# Patient Record
Sex: MALE | Race: WHITE | NOT HISPANIC OR LATINO | Employment: FULL TIME | URBAN - METROPOLITAN AREA
[De-identification: names, ages, dates, MRNs, and addresses within clinical notes are randomized per-mention and may not be internally consistent; named-entity substitution may affect disease eponyms.]

---

## 2022-07-06 DIAGNOSIS — M54.41 LUMBAGO WITH SCIATICA, RIGHT SIDE: ICD-10-CM

## 2022-07-06 DIAGNOSIS — M54.41 ACUTE BACK PAIN WITH SCIATICA, RIGHT: Primary | ICD-10-CM

## 2022-07-06 DIAGNOSIS — G62.9 NEUROPATHY: ICD-10-CM

## 2022-07-27 ENCOUNTER — HOSPITAL ENCOUNTER (OUTPATIENT)
Dept: RADIOLOGY | Facility: HOSPITAL | Age: 32
Discharge: HOME | End: 2022-07-27
Attending: NURSE PRACTITIONER
Payer: MEDICAID

## 2022-07-27 DIAGNOSIS — M54.41 LUMBAGO WITH SCIATICA, RIGHT SIDE: ICD-10-CM

## 2022-07-27 DIAGNOSIS — G62.9 NEUROPATHY: ICD-10-CM

## 2022-07-27 PROCEDURE — 72148 MRI LUMBAR SPINE W/O DYE: CPT | Mod: TC,PO

## 2022-08-22 DIAGNOSIS — K62.5 RECTAL BLEEDING: ICD-10-CM

## 2022-08-22 DIAGNOSIS — R10.13 EPIGASTRIC PAIN: Primary | ICD-10-CM

## 2022-08-22 DIAGNOSIS — R19.5 LOOSE STOOLS: ICD-10-CM

## 2022-08-22 DIAGNOSIS — R11.0 NAUSEA: ICD-10-CM

## 2022-09-07 ENCOUNTER — OFFICE VISIT (OUTPATIENT)
Dept: NEUROSURGERY | Facility: CLINIC | Age: 32
End: 2022-09-07
Payer: MEDICAID

## 2022-09-07 VITALS
RESPIRATION RATE: 18 BRPM | SYSTOLIC BLOOD PRESSURE: 127 MMHG | HEIGHT: 72 IN | HEART RATE: 80 BPM | WEIGHT: 229.94 LBS | DIASTOLIC BLOOD PRESSURE: 84 MMHG | BODY MASS INDEX: 31.14 KG/M2

## 2022-09-07 DIAGNOSIS — M51.36 DDD (DEGENERATIVE DISC DISEASE), LUMBAR: ICD-10-CM

## 2022-09-07 DIAGNOSIS — M54.9 DORSALGIA, UNSPECIFIED: Primary | ICD-10-CM

## 2022-09-07 DIAGNOSIS — M43.16 SPONDYLOLISTHESIS OF LUMBAR REGION: ICD-10-CM

## 2022-09-07 PROCEDURE — 3079F PR MOST RECENT DIASTOLIC BLOOD PRESSURE 80-89 MM HG: ICD-10-PCS | Mod: CPTII,,, | Performed by: STUDENT IN AN ORGANIZED HEALTH CARE EDUCATION/TRAINING PROGRAM

## 2022-09-07 PROCEDURE — 1159F PR MEDICATION LIST DOCUMENTED IN MEDICAL RECORD: ICD-10-PCS | Mod: CPTII,,, | Performed by: STUDENT IN AN ORGANIZED HEALTH CARE EDUCATION/TRAINING PROGRAM

## 2022-09-07 PROCEDURE — 1159F MED LIST DOCD IN RCRD: CPT | Mod: CPTII,,, | Performed by: STUDENT IN AN ORGANIZED HEALTH CARE EDUCATION/TRAINING PROGRAM

## 2022-09-07 PROCEDURE — 3008F BODY MASS INDEX DOCD: CPT | Mod: CPTII,,, | Performed by: STUDENT IN AN ORGANIZED HEALTH CARE EDUCATION/TRAINING PROGRAM

## 2022-09-07 PROCEDURE — 99204 PR OFFICE/OUTPT VISIT, NEW, LEVL IV, 45-59 MIN: ICD-10-PCS | Mod: S$PBB,,, | Performed by: STUDENT IN AN ORGANIZED HEALTH CARE EDUCATION/TRAINING PROGRAM

## 2022-09-07 PROCEDURE — 99204 OFFICE O/P NEW MOD 45 MIN: CPT | Mod: S$PBB,,, | Performed by: STUDENT IN AN ORGANIZED HEALTH CARE EDUCATION/TRAINING PROGRAM

## 2022-09-07 PROCEDURE — 3079F DIAST BP 80-89 MM HG: CPT | Mod: CPTII,,, | Performed by: STUDENT IN AN ORGANIZED HEALTH CARE EDUCATION/TRAINING PROGRAM

## 2022-09-07 PROCEDURE — 3074F SYST BP LT 130 MM HG: CPT | Mod: CPTII,,, | Performed by: STUDENT IN AN ORGANIZED HEALTH CARE EDUCATION/TRAINING PROGRAM

## 2022-09-07 PROCEDURE — 3008F PR BODY MASS INDEX (BMI) DOCUMENTED: ICD-10-PCS | Mod: CPTII,,, | Performed by: STUDENT IN AN ORGANIZED HEALTH CARE EDUCATION/TRAINING PROGRAM

## 2022-09-07 PROCEDURE — 3074F PR MOST RECENT SYSTOLIC BLOOD PRESSURE < 130 MM HG: ICD-10-PCS | Mod: CPTII,,, | Performed by: STUDENT IN AN ORGANIZED HEALTH CARE EDUCATION/TRAINING PROGRAM

## 2022-09-07 RX ORDER — ONDANSETRON 4 MG/1
TABLET, ORALLY DISINTEGRATING ORAL
COMMUNITY
Start: 2022-08-18

## 2022-09-07 RX ORDER — HYDROCODONE BITARTRATE AND ACETAMINOPHEN 5; 325 MG/1; MG/1
1 TABLET ORAL
COMMUNITY
Start: 2022-07-20 | End: 2022-09-20 | Stop reason: SDUPTHER

## 2022-09-07 RX ORDER — IBUPROFEN 800 MG/1
TABLET ORAL
COMMUNITY
Start: 2022-07-16

## 2022-09-07 NOTE — PROGRESS NOTES
[facility]  Clinic Consult     Consult Requested By: Geovanny Capps Jr, MD, [facility]        SUBJECTIVE:     Chief Complaint:   Chief Complaint   Patient presents with    Lumbar Spine Pain (L-Spine)     Patient presents to clinic with c/o R sided LBP x years with numbness/tingling that radiates into the leg and foot.        History of Present Illness:  Fernando Skinner is a 31 y.o. male who presents with a long history of back pain.  He was very active playing football, riding bikes, he has had several hard falls skateboarding.  He noted in 2017 working at [employer] lifting equipment up to 300lbs with co-workers and having severe back pain and dropping it  Now his pain has worsened and is associated with leg pain R> Left with tingling and numnbess  In thigh but shoots to foot    Has had recommendations from several doctors  Tired bracing for a few months but no improvement  Chiropractor couldn't help him    He is now in tears and has trouble with adls    Does have a mechanical component; he can improve lying down and much worse with standing and any blts             Review of patient's allergies indicates:   Allergen Reactions    Sulfa (sulfonamide antibiotics)        History reviewed. No pertinent past medical history.  History reviewed. No pertinent surgical history.  History reviewed. No pertinent family history.  Social History     Tobacco Use    Smoking status: Some Days     Packs/day: 0.50     Types: Cigarettes   Substance Use Topics    Alcohol use: Yes    Drug use: No        Review of Systems:      Constitutional: no fever, chills or night sweats. No changes in weight   Eyes: no diplopia, lid drooping, loss of vision   ENT: no nasal congestion, sore throat, discharge  Respiratory: no cough, shortness of breath, or pain  Cardiovascular: no chest pain or palpitations   Gastrointestinal: no nausea or vomiting + abd pain getting worked up   Genitourinary:  no hematuria or dysuria   Integument/Breast: no rash or pruritis   Hematologic/Lymphatic: no easy bruising or lymphadenopathy   Musculoskeletal:+   Neurological: no seizures or tremors   Behavioral/Psych: no auditory or visual hallucinations   Endocrine: no heat or cold intolerance   All other systems reviewed and are negative.      OBJECTIVE:     Vital Signs (Most Recent):  Pulse: 80 (09/07/22 1257)  Resp: 18 (09/07/22 1257)  BP: 127/84 (09/07/22 1257)    Physical Exam:    General: well developed, well nourished, no distress. .  Mental Status: Awake, Alert, Oriented x 4  Language: No aphasia  Speech: No dysarthria  Head: normocephalic, atraumatic.  Neck: trachea midline, no JVD   Cardiovascular: no LE edema  Pulmonary: normal respirations, no signs of respiratory distress  Abdomen: soft, non-distended  Skin: Skin is warm, dry and intact.    Motor Strength:  No abnormal movements seen.     Strength  Deltoids Triceps Biceps Wrist Extension Wrist Flexion Hand  Interossei     Upper: R 5/5 5/5 5/5 5/5 5/5 5/5 5/5      L 5/5 5/5 5/5 5/5 5/5 5/5 5/5       Iliopsoas Quadriceps Knee  Flexion Tibialis  anterior Gastro- cnemius EHL  Foot Eversion Foot inversion   Lower: R 5/5 5/5 5/5 5/5 5/5 5/5 5/5 5/5 5/5    L 5/5 5/5 5/5 5/5 5/5 5/5 5/5 5/5 5/5     SILT,PP dec lt pp right thigh and shin      DTR's: 1 + and symmetric in UE and LE  Avelar: absent  Clonus: absent  Babinski: absent    Gait:antalgic  Diffiult sit to stand  Diffuse pain lumbar paraspinal              Diagnostic Results:  I have independently reviewed the following imaging:  MRI: Reviewed  7/22    L2-5 DDD, disc osteophytes  L3-4 suspected pars fx  With grade 1 spondylolistheis    ASSESSMENT/PLAN:     L2-5 DDD  L3-4 spondyl  Progressive over years  High tate and severity, tearful with mechanical pain; limited all ADLs  Still working       chronic, worsening mechanical back pain with radiculopathy  L2-5 spondylosis  L3-4 grade 1 spondy, suspected pars  fx  dynamic xray CT, PT eval / pain mg   Will review images when complete          Ok Rendon MD  Neurosurgery

## 2022-09-08 ENCOUNTER — HOSPITAL ENCOUNTER (OUTPATIENT)
Dept: RADIOLOGY | Facility: HOSPITAL | Age: 32
Discharge: HOME | End: 2022-09-08
Attending: NURSE PRACTITIONER
Payer: MEDICAID

## 2022-09-08 DIAGNOSIS — R19.5 LOOSE STOOLS: ICD-10-CM

## 2022-09-08 DIAGNOSIS — R10.13 EPIGASTRIC PAIN: ICD-10-CM

## 2022-09-08 DIAGNOSIS — R11.0 NAUSEA: ICD-10-CM

## 2022-09-08 DIAGNOSIS — K62.5 RECTAL BLEEDING: ICD-10-CM

## 2022-09-08 PROCEDURE — 76700 US EXAM ABDOM COMPLETE: CPT | Mod: TC,PO

## 2022-09-13 DIAGNOSIS — M51.36 DDD (DEGENERATIVE DISC DISEASE), LUMBAR: ICD-10-CM

## 2022-09-13 DIAGNOSIS — M54.9 DORSALGIA, UNSPECIFIED: Primary | ICD-10-CM

## 2022-09-14 ENCOUNTER — HOSPITAL ENCOUNTER (OUTPATIENT)
Dept: RADIOLOGY | Facility: HOSPITAL | Age: 32
Discharge: HOME | End: 2022-09-14
Attending: STUDENT IN AN ORGANIZED HEALTH CARE EDUCATION/TRAINING PROGRAM
Payer: MEDICAID

## 2022-09-14 DIAGNOSIS — M54.9 DORSALGIA, UNSPECIFIED: ICD-10-CM

## 2022-09-14 PROCEDURE — 72131 CT LUMBAR SPINE WITHOUT CONTRAST: ICD-10-PCS | Mod: 26,,, | Performed by: RADIOLOGY

## 2022-09-14 PROCEDURE — 72110 X-RAY EXAM L-2 SPINE 4/>VWS: CPT | Mod: TC,FY

## 2022-09-14 PROCEDURE — 72131 CT LUMBAR SPINE W/O DYE: CPT | Mod: TC

## 2022-09-14 PROCEDURE — 72131 CT LUMBAR SPINE W/O DYE: CPT | Mod: 26,,, | Performed by: RADIOLOGY

## 2022-09-14 PROCEDURE — 72110 X-RAY EXAM L-2 SPINE 4/>VWS: CPT | Mod: 26,,, | Performed by: RADIOLOGY

## 2022-09-14 PROCEDURE — 72110 XR LUMBAR SPINE AP AND LAT WITH FLEX/EXT: ICD-10-PCS | Mod: 26,,, | Performed by: RADIOLOGY

## 2022-09-20 ENCOUNTER — OFFICE VISIT (OUTPATIENT)
Dept: PAIN MEDICINE | Facility: CLINIC | Age: 32
End: 2022-09-20
Payer: MEDICAID

## 2022-09-20 VITALS
HEIGHT: 72 IN | HEART RATE: 75 BPM | OXYGEN SATURATION: 99 % | WEIGHT: 187.06 LBS | DIASTOLIC BLOOD PRESSURE: 80 MMHG | BODY MASS INDEX: 25.34 KG/M2 | SYSTOLIC BLOOD PRESSURE: 140 MMHG

## 2022-09-20 DIAGNOSIS — M43.16 SPONDYLOLISTHESIS OF LUMBAR REGION: ICD-10-CM

## 2022-09-20 DIAGNOSIS — M51.36 DDD (DEGENERATIVE DISC DISEASE), LUMBAR: ICD-10-CM

## 2022-09-20 DIAGNOSIS — M54.16 RIGHT LUMBAR RADICULOPATHY: Primary | ICD-10-CM

## 2022-09-20 DIAGNOSIS — M54.9 DORSALGIA, UNSPECIFIED: ICD-10-CM

## 2022-09-20 PROCEDURE — 99999 PR PBB SHADOW E&M-EST. PATIENT-LVL IV: CPT | Mod: PBBFAC,,, | Performed by: ANESTHESIOLOGY

## 2022-09-20 PROCEDURE — 99204 OFFICE O/P NEW MOD 45 MIN: CPT | Mod: S$PBB,,, | Performed by: ANESTHESIOLOGY

## 2022-09-20 PROCEDURE — 3008F BODY MASS INDEX DOCD: CPT | Mod: CPTII,,, | Performed by: ANESTHESIOLOGY

## 2022-09-20 PROCEDURE — 3008F PR BODY MASS INDEX (BMI) DOCUMENTED: ICD-10-PCS | Mod: CPTII,,, | Performed by: ANESTHESIOLOGY

## 2022-09-20 PROCEDURE — 3077F SYST BP >= 140 MM HG: CPT | Mod: CPTII,,, | Performed by: ANESTHESIOLOGY

## 2022-09-20 PROCEDURE — 3079F DIAST BP 80-89 MM HG: CPT | Mod: CPTII,,, | Performed by: ANESTHESIOLOGY

## 2022-09-20 PROCEDURE — 99204 PR OFFICE/OUTPT VISIT, NEW, LEVL IV, 45-59 MIN: ICD-10-PCS | Mod: S$PBB,,, | Performed by: ANESTHESIOLOGY

## 2022-09-20 PROCEDURE — 99214 OFFICE O/P EST MOD 30 MIN: CPT | Mod: PBBFAC,PN | Performed by: ANESTHESIOLOGY

## 2022-09-20 PROCEDURE — 3079F PR MOST RECENT DIASTOLIC BLOOD PRESSURE 80-89 MM HG: ICD-10-PCS | Mod: CPTII,,, | Performed by: ANESTHESIOLOGY

## 2022-09-20 PROCEDURE — 99999 PR PBB SHADOW E&M-EST. PATIENT-LVL IV: ICD-10-PCS | Mod: PBBFAC,,, | Performed by: ANESTHESIOLOGY

## 2022-09-20 PROCEDURE — 3077F PR MOST RECENT SYSTOLIC BLOOD PRESSURE >= 140 MM HG: ICD-10-PCS | Mod: CPTII,,, | Performed by: ANESTHESIOLOGY

## 2022-09-20 RX ORDER — PANTOPRAZOLE SODIUM 20 MG/1
TABLET, DELAYED RELEASE ORAL
COMMUNITY
Start: 2022-07-07

## 2022-09-20 RX ORDER — ALPRAZOLAM 0.5 MG/1
1 TABLET, ORALLY DISINTEGRATING ORAL ONCE AS NEEDED
Status: CANCELLED | OUTPATIENT
Start: 2022-09-20 | End: 2034-02-16

## 2022-09-20 RX ORDER — HYDROCODONE BITARTRATE AND ACETAMINOPHEN 5; 325 MG/1; MG/1
1 TABLET ORAL EVERY 8 HOURS PRN
Qty: 15 TABLET | Refills: 0 | Status: SHIPPED | OUTPATIENT
Start: 2022-09-20

## 2022-09-20 RX ORDER — GABAPENTIN 300 MG/1
300 CAPSULE ORAL NIGHTLY
Qty: 30 CAPSULE | Refills: 1 | Status: ON HOLD | OUTPATIENT
Start: 2022-09-20 | End: 2022-11-18 | Stop reason: HOSPADM

## 2022-09-20 RX ORDER — INDOMETHACIN 25 MG/1
CAPSULE ORAL
COMMUNITY
Start: 2022-08-03

## 2022-09-20 NOTE — PROGRESS NOTES
This note was completed with dictation software and grammatical errors may exist.    Chief Complaint   Patient presents with    Back Pain        HPI: Fernando Skinner is a 31 y.o. year old male patient who has no significant past medical history. He presents in referral from Dr. Ok Rendon for back pain, right leg pain.  Injection.  He reports that he has had back pain for the last 15 years, has had multiple injuries, skateboarding, football.  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  He states that over the last year his pain has severely worsened and has gotten to the point where he has difficulty going to work but he has been making sure that he stable pay the bills.  He states the pain is across the bilateral low back but throughout the entire right leg.  He does have some symptoms in the left side at times but primarily it is in the right side.  He denies any adamairs weakness, no falls.  He states that he gets pain into the right testicle, right anterior thigh, right calf and anterior shin.  He describes the pain as aching, dull, throbbing, tight, deep, numb sharp and shooting.  Is worse with prolonged sitting or standing, doing any bending, coughing or sneezing or getting out of a bed or a chair.  He does have some left foot weakness from an injury many years ago but he does not feel that there is anything related to his back necessarily.  He does have difficulty picking up his right thigh.  He has been seen by Neurosurgery, Dr. Rendon who has done CT, MRI and x-rays.  He was sent here for possible epidural steroid      Pain intervention history:None    Spine surgeries:  None    Antineuropathics:  NSAIDs:  Ibuprofen 800  Physical therapy:He is currently set up to start PT  Antidepressants:  Muscle relaxers:  Opioids:Had been taking some hydrocodone that was left over from a previous surgery  Antiplatelets/Anticoagulants:        ROS:  Reports weight loss,  stomach pain, nausea, joint stiffness, back pain, difficulty sleeping.  Balance of review of systems is negative.    No results found for: LABA1C, HGBA1C    No results found for: WBC, HGB, HCT, MCV, PLT          History reviewed. No pertinent past medical history.    History reviewed. No pertinent surgical history.    Social History     Socioeconomic History    Marital status: Single   Tobacco Use    Smoking status: Some Days     Packs/day: 0.50     Types: Cigarettes   Substance and Sexual Activity    Alcohol use: Yes    Drug use: No    Sexual activity: Yes     Partners: Female         Medications/Allergies: See med card    Vitals:    22 0921   BP: (!) 140/80   Pulse: 75   SpO2: 99%   Weight: 84.8 kg (187 lb 1 oz)   Height: 6' (1.829 m)   PainSc:   8   PainLoc: Back     Body mass index is 25.37 kg/m².    Physical exam:  Gen: A and O x3, tearful  Skin: No rashes or obvious lesions  HEENT: PERRLA, no obvious deformities on ears or in canals. Trachea midline.  CVS: Regular rate and rhythm, normal palpable pulses.  Resp:No increased work of breathing, symmetrical chest rise.  Abdomen: Soft, NT/ND.  Musculoskeletal: No antalgic gait. Sits in a forward flexed position.          Neuro:    Iliopsoas Quadriceps Knee  Flexion Tibialis  anterior Gastro- cnemius EHL   Lower: R 4/5 5/5 5/5 5/5 5/5 5/5    L 5/5 5/5 5/5 5/5 5/5 4/5      Left  Right    Patellar DTR 2+ 2+   Achilles DTR 2+ 2+                    Intact and symmetrical to light touch and pinprick in L1-S1 dermatomes bilaterally.    Lumbar spine:  Lumbar spine:  Full with flexion, severely reduced with extension with increased pain in the back and right leg  Demetrio's test causes no increased pain on either side.    Supine straight leg raise is positive for right leg pain at 45°   Internal and external rotation of the hip causes no increased pain on either side.  Myofascial exam: No tenderness to palpation across lumbar paraspinous muscles.    Imagin22  MRI L-spine:   There is no evidence of lumbar fracture or osseous destructive lesion. Degenerative endplate signal changes are noted at L3-4. There is bilateral L3 spondylolysis with grade 1 spondylolisthesis of up to 4 mm. Alignment at all of the levels is normal. Signal within the conus medullaris is within normal limits.   T12-L1: Mild broad-based disc bulge without significant central canal or foraminal stenosis.   L1-2: No significant abnormalities.   L2-3: There is disc desiccation with mild broad-based disc protrusion and outer annular tear. There is resultant mild narrowing of the spinal canal. The foramina are unremarkable.   L3-4: Grade 1 spondylolisthesis and mild disc bulging result in mild narrowing of the spinal canal. There is mild to moderate bilateral foraminal narrowing. Disc material is in close proximity to the L3 nerve roots, without definite nerve root impingement.   L4-5: Mild broad-based disc bulge results in mild narrowing of the central canal and foramina, without nerve root impingement.   L5-S1: Mild right-sided degenerative facet hypertrophy. No significant central canal or foraminal stenosis.    9/14/22 CT L-spine:  Vertebral column: As seen on comparison studies, there is grade 1 spondylolisthesis at the L3-4 level measuring approximately 4-5 mm on supine CT with a similar appearance to comparison MRI.  There is bilateral spondylolysis at this level as well as at the L2-3 level.  There is moderate to marked disc space narrowing at the L3-4 level.  There is also mild retrolisthesis of L4 on L5 measuring approximately 3 mm, unchanged.  Alignment is otherwise normal.  Vertebral body height is preserved.  There is no fracture.   Spinal canal, conus, epidural space: The spinal canal may be borderline small on a developmental basis.  There is no abnormal epidural mass or fluid collection.   Findings by level:   T12-L1: There is a mild disc bulge.  There is no spinal canal or significant  foraminal stenosis.  There is no change.   L1-2: There is no spinal canal or significant foraminal stenosis.   L2-3: There is a mild disc bulge which flattens the ventral dural sac.  There is no spinal stenosis.  There is mild bilateral foraminal stenosis.  There is bilateral spondylolysis.   L3-4: There is grade 1 spondylolisthesis with bilateral spondylolysis, disc space narrowing with unroofing of a moderate diffuse disc bulge.  There is no spinal stenosis but there is moderate bilateral foraminal stenosis.   L4-5: There is mild retrolisthesis of L4 on L5.  There is mild disc space narrowing at this level.  There is inferior unroofing of a broad disc protrusion.  There is no spinal stenosis.  There is moderate left and mild right foraminal stenosis without change.   L5-S1: There is no spinal canal or foraminal stenosis.  There is mild facet joint arthropathy and partial sacralization of L5 on the left.   Soft tissues, other: The prevertebral soft tissues are normal.  The aorta is normal in caliber.  There is no hydronephrosis.    9/14/22 L-spine flex/ext:  1. There is anterolisthesis of L3 on L4 where there is bilateral spondylolysis.  There is motion at this level with flexion and extension.  2. There is mild retrolisthesis of L4 on L5 which is unchanged on flexion or extension.  3. There is no acute fracture.    Assessment:  Fernando Skinner is a 31 y.o. year old male patient who has no past medical history on file. He presents in referral from Dr. Ok Rendon for back pain.  1. Right lumbar radiculopathy  Vital signs    Verify informed consent    Notify physician     Notify physician     Notify physician (specify)    Diet NPO    Case Request Operating Room: Injection-steroid-epidural-lumbar L4/5 to right    Place in Outpatient    alprazolam ODT dissolvable tablet 1 mg      2. Spondylolisthesis of lumbar region        3. Dorsalgia, unspecified  Ambulatory referral/consult to Pain Clinic      4. DDD  (degenerative disc disease), lumbar            Plan:  1.  We discussed that he has spondylolisthesis at L3/4 with foraminal narrowing particularly to the right side at L3/4 which would seem to correspond with his right thigh pain, also some foraminal narrowing on the right side at L4/5.  He states that he would have surgery at this point if this would fix it but we discussed possibly trying epidural steroid injection to see if this provides some relief so that he does not have to undergo surgery just yet.  At this point he does have some right iliopsoas weakness but unclear if this is due to pain.  We discussed that if he is not responding to physical therapy, injections and medication, he may need to have surgery.    2.  I am going to set him up for an L4/5 HERLINDA with spread to the right side to cover both the L3/4 and L4/5 levels.  3.  I have sent a gabapentin prescription to take 300 mg nightly for now.  I have also called in a small course of hydrocodone to use sparingly.  We discussed that this would not be a long-term prescription.  4.  He is starting physical therapy, is difficult for his work schedule but I encouraged him to do this.  5. He will follow up in 2 weeks after his injection or sooner as needed.        Thank you for referring this interesting patient, and I look forward to continuing to collaborate in his care.

## 2022-09-25 ENCOUNTER — HOSPITAL ENCOUNTER (EMERGENCY)
Facility: HOSPITAL | Age: 32
Discharge: HOME | End: 2022-09-25
Attending: EMERGENCY MEDICINE
Payer: MEDICAID

## 2022-09-25 VITALS
SYSTOLIC BLOOD PRESSURE: 123 MMHG | BODY MASS INDEX: 25.73 KG/M2 | RESPIRATION RATE: 16 BRPM | HEIGHT: 72 IN | HEART RATE: 101 BPM | DIASTOLIC BLOOD PRESSURE: 75 MMHG | OXYGEN SATURATION: 100 % | WEIGHT: 190 LBS | TEMPERATURE: 98 F

## 2022-09-25 DIAGNOSIS — Z63.4 GRIEF AT LOSS OF CHILD: Primary | ICD-10-CM

## 2022-09-25 DIAGNOSIS — F43.21 GRIEF AT LOSS OF CHILD: Primary | ICD-10-CM

## 2022-09-25 DIAGNOSIS — F43.0 ACUTE STRESS DISORDER: ICD-10-CM

## 2022-09-25 PROCEDURE — 99205 PR OFFICE/OUTPT VISIT, NEW, LEVL V, 60-74 MIN: ICD-10-PCS | Mod: AF,HB,95, | Performed by: STUDENT IN AN ORGANIZED HEALTH CARE EDUCATION/TRAINING PROGRAM

## 2022-09-25 PROCEDURE — 99205 OFFICE O/P NEW HI 60 MIN: CPT | Mod: AF,HB,95, | Performed by: STUDENT IN AN ORGANIZED HEALTH CARE EDUCATION/TRAINING PROGRAM

## 2022-09-25 PROCEDURE — 25000003 PHARM REV CODE 250: Performed by: EMERGENCY MEDICINE

## 2022-09-25 PROCEDURE — 99283 EMERGENCY DEPT VISIT LOW MDM: CPT

## 2022-09-25 RX ORDER — LORAZEPAM 1 MG/1
1 TABLET ORAL
Status: COMPLETED | OUTPATIENT
Start: 2022-09-25 | End: 2022-09-25

## 2022-09-25 RX ORDER — LORAZEPAM 1 MG/1
0.5 TABLET ORAL EVERY 6 HOURS PRN
Qty: 10 TABLET | Refills: 0 | Status: SHIPPED | OUTPATIENT
Start: 2022-09-25 | End: 2022-11-16

## 2022-09-25 RX ADMIN — LORAZEPAM 1 MG: 1 TABLET ORAL at 07:09

## 2022-09-25 SDOH — SOCIAL DETERMINANTS OF HEALTH (SDOH): DISSAPEARANCE AND DEATH OF FAMILY MEMBER: Z63.4

## 2022-09-26 NOTE — ED PROVIDER NOTES
Encounter Date: 9/25/2022       History     Chief Complaint   Patient presents with    Mental Health Problem     Mental health evaluation after loss of child     I received turnover from  her requesting patient have psychiatry consultation secondary to acute brief episode.  Today unfortunately patient's son passed away.  Per  upon hearing news patient was extremely distraught, angry, overwhelmed with grief.  Patient came to the emergency depart requesting evaluation.    Review of patient's allergies indicates:   Allergen Reactions    Sulfa (sulfonamide antibiotics)      No past medical history on file.  No past surgical history on file.  No family history on file.  Social History     Tobacco Use    Smoking status: Some Days     Packs/day: 0.50     Types: Cigarettes   Substance Use Topics    Alcohol use: Yes    Drug use: No     Review of Systems   Unable to perform ROS: Acuity of condition     Physical Exam     Initial Vitals [09/25/22 1934]   BP Pulse Resp Temp SpO2   123/75 101 16 98.1 °F (36.7 °C) 100 %      MAP       --         Physical Exam    Nursing note and vitals reviewed.  Constitutional: He is not diaphoretic. He appears distressed.   Pleasant, polite.   HENT:   Head: Normocephalic and atraumatic.   Eyes: EOM are normal.   Neck: Neck supple.   Normal range of motion.  Pulmonary/Chest: No respiratory distress.   Musculoskeletal:      Cervical back: Normal range of motion and neck supple.     Neurological: He is alert.   Skin: Skin is warm and dry.   Psychiatric: His mood appears anxious. His affect is angry and labile. He exhibits a depressed mood.       ED Course   Procedures  Labs Reviewed - No data to display       Imaging Results    None          Medications   LORazepam tablet 1 mg (1 mg Oral Given 9/25/22 1954)     Medical Decision Making:   Initial Assessment:   Patient experiencing acute grief episode  Differential Diagnosis:   Acute grief  ED Management:  Telepsychiatry  recommended Ativan therapy.  Patient given prescription for Ativan as well as oral Ativan in the emergency department.  In patient consultation to  was placed for follow-up for patient to receive counseling.  The  is in the room with patient.                        Clinical Impression:   Final diagnoses:  [F43.21, Z63.4] Grief at loss of child (Primary)        ED Disposition Condition    Discharge Stable          ED Prescriptions       Medication Sig Dispense Start Date End Date Auth. Provider    LORazepam (ATIVAN) 1 MG tablet Take 0.5 tablets (0.5 mg total) by mouth every 6 (six) hours as needed for Anxiety. 10 tablet 9/25/2022 10/25/2022 Wilner Miles MD          Follow-up Information       Follow up With Specialties Details Why Contact Info    Geovanny Capps Jr., MD Family Medicine In 1 week  [address] [facility] [address]  [phone]               Wilner Miles MD  09/25/22 2012

## 2022-09-26 NOTE — CONSULTS
[facility]  Psychiatry  Telepsychiatry Consult Note    Patient agreeable to consultation via telepsychiatry.    Tele-Consultation from Psychiatry started: 9/25/2022 at 7:30p  The chief complaint leading to psychiatric consultation is: acute grief response  This consultation was requested by Dr. Miles the Emergency Department attending physician.  The location of the consulting psychiatrist is [place], [place]  The patient location is  [facility] EMERGENCY DEPARTMENT   The patient arrived at the ED at: approx 6:45p  Also present with the patient at the time of the consultation: nurse    Patient Identification:   Fernando Skinner is a 31 y.o. male.    Patient information was obtained from the patient  Patient presented voluntarily to the Emergency Department by private vehicle.    Consults  Consult Start Time: 09/25/2022 19:30 CDT        Subjective:     History of Present Illness:  31 year old with unknown past psych history presenting to the hospital after notification that his teenage son could not be resuscitated and passed away in [facility] ER this evening. Patient was at home when he was notified and proceeded to throw items around the house, per ED doctor Jd he was offered to be seen at the ED to help with acute grief.     On interview with patient, he was kneeling on the floor and crying unconsolably. Offered patient listening ear and calming medications. Pt not interested to talk but did say he may appreciate meds.     Pt refusing at this time to discuss full history--     Psych Review of Symptoms: items highlighted should be considered positive  KEL, pt refused    Psychiatric History:   KEL, pt refusing    Substance Abuse History: KEL    Legal History: Past charges/incarcerations: KEL    Family Psychiatric History:   KEL    Social History:  KEL    Psychiatric Mental Status Exam:  Arousal: alert  Sensorium/Orientation: oriented to grossly intact  Behavior/Cooperation: psychomotor agitation  "  Speech:  emotional  Language: grossly intact  Mood: " Oh my god "   Affect:  dysphoric  Thought Process: KEL  Thought Content: KEL  Auditory hallucinations: KEL  Visual hallucinations: KEL  Paranoia: KEL  Delusions:  KEL  Suicidal ideation: KEL   Homicidal ideation: KEL  Attention/Concentration: KEL  Memory:  KEL  Fund of Knowledge:  [facility]  Abstract reasoning: [facility]  Insight: KEL  Judgment: KEL      Past Medical History: No past medical history on file.   Laboratory Data:   Labs Reviewed   CBC W/ AUTO DIFFERENTIAL   COMPREHENSIVE METABOLIC PANEL   URINALYSIS, REFLEX TO URINE CULTURE   DRUG SCREEN PANEL, URINE EMERGENCY   ALCOHOL,MEDICAL (ETHANOL)   ACETAMINOPHEN LEVEL   SALICYLATE LEVEL       Neurological History:  KEL    Allergies:   Review of patient's allergies indicates:   Allergen Reactions    Sulfa (sulfonamide antibiotics)        Medications in ER: Medications - No data to display    Medications at home: unk    No new subjective & objective note has been filed under this hospital service since the last note was generated.      Assessment - Diagnosis - Goals:     Assessment:  Acute Stress D/o  Grief    Pt's son  several hours prior to consultation- pt observed crying inconsolably.     Plan:  1. Dispo/Legal Status: unable to assess safety at this time, pt willing to remain in ED for medication to treat acute dysphoria- should pt endorse plan to harm himself immenently when he is calmer and able to speak, please PEC and re-consult for full assessment  2. Scheduled Medications: noneDefer changes to primary inpt team. Defer any non-psych meds to the ER MD.  3. PRN Medications: Ativan prn non-redirectable agitation associated with grief and if needed to help the pt more effectively interact with his environment.   4. Precautions/Nursing: violence (per collateral, pt was agitated at home and throwing things)  5. To-Do: reassess if needed, provide patient with supportive therapeutic check-ins  6. Provide patient " with grief resources per [facility]       Time with patient: 6 minutes      More than 50% of the time was spent counseling/coordinating care    Consulting clinician was informed of the encounter and consult note.    Consultation ended: 9/25/2022 at 7:58p    Georgette Vanegas MD   Psychiatry  [facility]

## 2022-09-30 ENCOUNTER — TELEPHONE (OUTPATIENT)
Dept: PAIN MEDICINE | Facility: CLINIC | Age: 32
End: 2022-09-30
Payer: MEDICAID

## 2022-09-30 NOTE — TELEPHONE ENCOUNTER
----- Message from Lashawn Can sent at 9/30/2022  9:05 AM CDT -----  Contact: Pt  Type: Needs Medical Advice    Who Called:  Pt    Best Call Back Number: [phone]    Additional Information: Please call back about upcoming procedure.  Pt's son passed away.      Thanks.

## 2022-10-04 ENCOUNTER — CLINICAL SUPPORT (OUTPATIENT)
Dept: REHABILITATION | Facility: HOSPITAL | Age: 32
End: 2022-10-04
Attending: STUDENT IN AN ORGANIZED HEALTH CARE EDUCATION/TRAINING PROGRAM
Payer: MEDICAID

## 2022-10-04 DIAGNOSIS — G89.29 CHRONIC BILATERAL LOW BACK PAIN WITHOUT SCIATICA: ICD-10-CM

## 2022-10-04 DIAGNOSIS — M62.81 MUSCLE WEAKNESS: ICD-10-CM

## 2022-10-04 DIAGNOSIS — R26.9 ABNORMAL GAIT: ICD-10-CM

## 2022-10-04 DIAGNOSIS — M54.9 DORSALGIA, UNSPECIFIED: ICD-10-CM

## 2022-10-04 DIAGNOSIS — M54.50 CHRONIC BILATERAL LOW BACK PAIN WITHOUT SCIATICA: ICD-10-CM

## 2022-10-04 PROCEDURE — 97162 PT EVAL MOD COMPLEX 30 MIN: CPT | Mod: PN

## 2022-10-05 PROBLEM — M54.50 CHRONIC BILATERAL LOW BACK PAIN WITHOUT SCIATICA: Status: ACTIVE | Noted: 2022-10-05

## 2022-10-05 PROBLEM — M62.81 MUSCLE WEAKNESS: Status: ACTIVE | Noted: 2022-10-05

## 2022-10-05 PROBLEM — R26.9 ABNORMAL GAIT: Status: ACTIVE | Noted: 2022-10-05

## 2022-10-05 PROBLEM — G89.29 CHRONIC BILATERAL LOW BACK PAIN WITHOUT SCIATICA: Status: ACTIVE | Noted: 2022-10-05

## 2022-10-06 ENCOUNTER — TELEPHONE (OUTPATIENT)
Dept: SURGERY | Facility: HOSPITAL | Age: 32
End: 2022-10-06
Payer: MEDICAID

## 2022-10-06 NOTE — TELEPHONE ENCOUNTER
Call placed to Pt to schedule procedure with Dr. William. Pt states the he would like to wait and keep his f/u on 11-4-22 to discuss how to proceed. Pt states he lost his son a few days ago so he is not in a good place right now and needs time to think. States he will decide at his f/u. He has not been taking the Gabapentin as it makes him drowsy.

## 2022-10-06 NOTE — TELEPHONE ENCOUNTER
Good morning,    Mr. Skinner called today to check in regarding his cancelled procedure with Dr. William. He states he would like to complete physical therapy twice a week for 10 weeks and then see if he needs an injection after that point in time.     I instructed him to contact Dr. William's clinic should he feel that he needs to come in for an injection sooner.     Please contact him to discuss scheduling his HERLINDA 10 weeks out from today.     Thank you!

## 2022-10-10 NOTE — PLAN OF CARE
"[facility]   Physical Therapy Initial Evaluation     Date: 10/4/2022   Name: Fernando Skinner  Clinic Number: [id number]    Therapy Diagnosis:   Encounter Diagnoses   Name Primary?    Dorsalgia, unspecified     Chronic bilateral low back pain without sciatica     Muscle weakness     Abnormal gait      Physician: Ok Rendon MD    Physician Orders: PT Eval and Treat   Medical Diagnosis from Referral: M54.9 (ICD-10-CM) - Dorsalgia, unspecified  Evaluation Date: 10/4/2022  Authorization Period Expiration: 9/7/23  Plan of Care Expiration: 12/31/22  Progress Note Due: 11/4/22  Visit # / Visits authorized: 1/ 1   FOTO: NT will assess at 2nd visit  FOTO 1st Follow Up:  FOTO 2nd Follow Up:      Precautions: Standard , spondylolisthesis grade 1     Time In: 15:03  Time Out: 16:01  Total Appointment Time (timed & untimed codes): 58 minutes      SUBJECTIVE     Date of onset: years ago    History of current condition - Fernando reports: having back pain that started around 15 but has gotten worse over the last year. He has significant pain during work and everyday activities. He reports the pain is across his low back with sometimes getting symptoms into the right leg/glute area. Symptoms down his leg are occasional and can go down into his right calf. He reports the pain is "excruciating and debilitating" and feels like "need surgery or something" to get the pain out. He has been feeling depressed and sad due to losing his child last week and "does not know what to do". Would like to stay with PT today due to having significant pain but not sure what he is able to handle due to pain and dealing with other family issues.     Falls: none    Imaging, CT - Impression:     1. There is grade 1 spondylolisthesis with bilateral spondylolysis at the L3-4 level where there is also moderate to marked disc space narrowing.  There is bilateral spondylolysis at the L2-3 level as well.  There is unroofing of " "disc bulges at these levels as well as at the L4-5 level where there is very mild retrolisthesis of L4 on L5.  There is no significant spinal stenosis at any level but there is some degree of foraminal narrowing at the L2-3, L3-4 and L4-5 levels.  Considering differences in modality, there is no change compared to the prior MRI.    Prior Therapy: none  Social History: 1 kids  Occupation: electrical work, heavy lifting and difficult positions  Prior Level of Function: pain free with work activities  Current Level of Function: difficulty with movement, ADL's, work activities.     Pain:  Current 7/10, worst 10/10, best 6/10   Location: right and bilateral back , groin , and lower legs .   Description: Aching, Dull, Burning, Deep, and Numb  Aggravating Factors: Sitting, Standing, Morning, Extension, and Flexing  Easing Factors: pain medication, lying down, heating pad, and prone    Patients goals: relieve the pain in my back to be able to work, play with kids, and get back to enjoying life.      Medical History:   No past medical history on file.    Surgical History:   Fernando Skinner  has no past surgical history on file.    Medications:   Fernando has a current medication list which includes the following prescription(s): gabapentin, hydrocodone-acetaminophen, ibuprofen, indomethacin, lorazepam, ondansetron, and pantoprazole.    Allergies:   Review of patient's allergies indicates:   Allergen Reactions    Sulfa (sulfonamide antibiotics)           OBJECTIVE     Observation: emotional throughout the session. Fear avoidance with movement.     Posture:  flat back, decreased lumbar lordosis, forward head, rounded shoulders.     Lumbar Range of Motion:    Limitations Pain   Flexion 80%   Significant in low back     Extension 75%   Significant in low back, "tingling in his private area"     Left Side Bending 80% Pain in back     Right Side Bending 80% Pain in back                Myotomes Right Left Comments   L2 4-/5 " "4-/5    L3 5/5 5/5     L4 4/5 4/5     L5 5/5 5/5     S1 4/5 4/5     S2 4-/5 4-/5     -some discomfort in seated position and fatigue following testing with increased symptoms     Sensation: intact light touch tobias LE    Reflexes: tobias  -Patellar (L3-L4): 1+  -Achilles (S1): 1+    Special Tests: NT due to patient discomfort and high irritability  -SLR Test:   -Repeated Flexion:   -Repeated Ext:   -Prone Instability Test:   -Bridge Test:   -Slump Test:   Joint Mobility:      Limitation/Restriction for FOTO lumbar Survey    Therapist reviewed FOTO scores for Fernando Skinner on 10/4/2022.   FOTO documents entered into [employer] - see Media section.    Limitation Score: will assess at future visits         TREATMENT     Total Treatment time (time-based codes) separate from Evaluation: 10 minutes      Fernando received the treatments listed below:      therapeutic exercises to develop strength and endurance for 10 minutes including:    Hip add/abd iso 10x10"  Seated/standing glute sets 10x10"  HEP education      PATIENT EDUCATION AND HOME EXERCISES     Education provided:   - HEP  - importance of muscle activation  - decrease lumbar spine irritability  - Bed mobility, log roll technique  - minimize ext movements    Written Home Exercises Provided: yes. Exercises were reviewed and Fernando was able to demonstrate them prior to the end of the session.  Fernando demonstrated good  understanding of the education provided. See EMR under Patient Instructions for exercises provided during therapy sessions.    ASSESSMENT     Fernando is a 31 y.o. male referred to outpatient Physical Therapy with a medical diagnosis of Dorsalgia, unspecified. Patient presents with high irritability, high fear avoidance, abnormal posture, abnormal gait, decreased activity tolerance, and pain affecting everyday activities. He was emotional during the session due to the loss of his child last week and due to back pain. He was educated heavily about the " importance of movement during the day and muscle activation with exercises. He fatigued quickly with light muscle activation exercises but had improved symptoms at the end of session. We discussed and he agreed to join the healthy back program and is an appropriate candidate for the program due to chronicity of symptoms and need for coaching.     Patient prognosis is Guarded.   Patient will benefit from skilled outpatient Physical Therapy to address the deficits stated above and in the chart below, provide patient /family education, and to maximize patientt's level of independence.     Plan of care discussed with patient: Yes  Patient's spiritual, cultural and educational needs considered and patient is agreeable to the plan of care and goals as stated below:     Anticipated Barriers for therapy: high irritability    Medical Necessity is demonstrated by the following  History  Co-morbidities and personal factors that may impact the plan of care Co-morbidities:   high BMI, HTN, young age, and chronicity of symptoms    Personal Factors:   age     high   Examination  Body Structures and Functions, activity limitations and participation restrictions that may impact the plan of care Body Regions:   back  lower extremities  upper extremities    Body Systems:    gross symmetry  ROM  strength  gross coordinated movement  balance  gait  motor control  motor learning    Participation Restrictions:   High irritability, family     Activity limitations:   Learning and applying knowledge  no deficits    General Tasks and Commands  no deficits    Communication  no deficits    Mobility  lifting and carrying objects  walking    Self care  no deficits    Domestic Life  no deficits    Interactions/Relationships  no deficits    Life Areas  no deficits    Community and Social Life  no deficits         high   Clinical Presentation evolving clinical presentation with changing clinical characteristics moderate   Decision Making/ Complexity  Score: moderate     Goals to be assessed at next visit by healthy back PT    PLAN   Plan of care Certification: 10/4/2022 to 12/31/22.    Outpatient Physical Therapy 2 times weekly for 12 weeks to include the following interventions: Cervical/Lumbar Traction, Gait Training, Manual Therapy, Moist Heat/ Ice, Neuromuscular Re-ed, Patient Education, Self Care, Therapeutic Activities, and Therapeutic Exercise.     Todd Mcpherson, PT      I CERTIFY THE NEED FOR THESE SERVICES FURNISHED UNDER THIS PLAN OF TREATMENT AND WHILE UNDER MY CARE   Physician's comments:     Physician's Signature: ___________________________________________________

## 2022-10-12 ENCOUNTER — CLINICAL SUPPORT (OUTPATIENT)
Dept: REHABILITATION | Facility: HOSPITAL | Age: 32
End: 2022-10-12
Payer: MEDICAID

## 2022-10-12 DIAGNOSIS — M62.81 MUSCLE WEAKNESS: ICD-10-CM

## 2022-10-12 DIAGNOSIS — M54.50 CHRONIC BILATERAL LOW BACK PAIN WITHOUT SCIATICA: Primary | ICD-10-CM

## 2022-10-12 DIAGNOSIS — G89.29 CHRONIC BILATERAL LOW BACK PAIN WITHOUT SCIATICA: Primary | ICD-10-CM

## 2022-10-12 DIAGNOSIS — R26.9 ABNORMAL GAIT: ICD-10-CM

## 2022-10-12 PROCEDURE — 97110 THERAPEUTIC EXERCISES: CPT | Mod: PN

## 2022-10-12 NOTE — PROGRESS NOTES
[facility] Treatment & Updated Plan of Care     Name: Fernando Skinner  Clinic Number: [id number]    Therapy Diagnosis:   Encounter Diagnoses   Name Primary?    Chronic bilateral low back pain without sciatica Yes    Muscle weakness     Abnormal gait       Physician: Ok Rendon MD    Visit Date: 10/12/2022    Physician Orders: PT Eval and Treat   Medical Diagnosis from Referral: M54.9 (ICD-10-CM) - Dorsalgia, unspecified  Evaluation Date: 10/4/2022  Authorization Period Expiration: 12/31/22  Plan of Care Expiration: 12/31/22  Progress Note Due: 11/4/2022  Visit # / Visits authorized: 1/1 [facility] (2/2 total)   FOTO: 1/3     Time In: 2:30 pm   Time Out: 4:20 pm   Total Billable Time: 100 minutes + 10 minutes cold pack    Insurance: Medicaid     Precautions: Standard , spondylolisthesis grade 1     Pattern of pain determined: 1 PEP    Subjective   Fernando reports his back has hurt from age of 15. He reports he has been rough on his body throughout his life. His pain is in his low back mainly. He reports it limits his activities of daily living severely and is decreasing his quality of life.     Patient reports tolerating previous visit without excess discomfort   Patient reports their pain to be 5/10 on a 0-10 scale with 0 being no pain and 10 being the worst pain imaginable.  Pain Location: bilateral low back    Occupation: electrical work, heavy lifting and difficult positions  Prior Level of Function: pain free with work activities  Current Level of Function: difficulty with movement, ADL's, work activities.     Patients goals: relieve the pain in my back to be able to work, play with kids, and get back to enjoying life.     Objective     MOVEMENT LOSS    ROM Loss   Flexion major loss   Extension moderate loss   Right Side Bend  major loss      Left Side Bend  major loss      Rotation Right major loss   Rotation Left minimal loss     Limitation/Restriction for FOTO 1st  "Survey    Therapist reviewed FOTO scores for Fernando Skinner on 10/12/2022.   FOTO documents entered into [employer] - see Media section.    Limitation Score: 67%        Outcomes: FOTO  Initial score: 67% limitation   Visit 5 score:  Goal: </=60%     Baseline IM Testing Results:   Date of testing: 10/12/2022  ROM 0-36 deg   Max Peak Torque 49    Min Peak Torque 23    Flex/Ext Ratio 2.1/1   % below normative data 87       The following objective data obtain from initial evaluation:  Lumbar Range of Motion    Limitations Pain   Flexion 80%    Significant in low back      Extension 75%    Significant in low back, "tingling in his private area"      Left Side Bending 80% Pain in back      Right Side Bending 80% Pain in back            Myotomes Right Left   L2 4-/5 4-/5   L3 5/5 5/5   L4 4/5 4/5   L5 5/5 5/5   S1 4/5 4/5   S2 4-/5 4-/5       Treatment    Pt was instructed in and performed the following:     Fernando received therapeutic exercises to develop/improved posture, cardiovascular endurance, muscular endurance, lumbar/cervical ROM, strength and muscular endurance for 100 minutes including the following exercises:     BRIAN x 3 minutes   Prone press ups 2 x 10   Prone press up with PT OP 1 x 10   MedX baseline testing     [facility] 10/12/2022   Visit Number 1   VAS Pain Rating 5   Lumbar Extension Seat Pad 0   Femur Restraint 6   Top Dead Center 24   Counterweight 283   Lumbar Flexion 30   Lumbar Extension 0   Lumbar Peak Torque 49   Min Torque 23   Test Percent Below Normative Data 87       Home Exercises Provided and Patient Education Provided   Stretching: prone press up, BRIAN, EIS  Strengthening: bridges  Cardio program (V5):   Lifting education (V11):  Posture/ Using Lumbar Roll: educated, not currently in use    Education provided:   - Patient received education regarding proper posture and body mechanics.  Patient was given top [facility] Visit 1 handouts which discuss what to expect in " therapy, the purpose and opportunity for health coaching, the program,  wellness when discharged from therapy, back education and care specifically for posture seated, standing, lifting correctly, components of exercise, importance of nutrition and hydration, and importance of sleep.   Information on lumbar rolls provided.  - Ryan roll tried, recommended, and purchase information was provided.    - Patient received a handout regarding anticipated muscular soreness following the isometric test and strategies for management were reviewed with patient including stretching, using ice and scheduled rest.   - Patient received education on the Healthy Back program, purpose of the isometric test, progression of back strengthening as well as wellness approach and systemic strengthening.  Details of the program were discussed.  Reviewed that patient should feel support/pressure from med ex restraints but no pain or discomfort and patient expressed understanding.  Med x dynamic exercise and baseline IM test performed with instructions to guide the patient safely through the isometric testing.  Patient informed to perform isometric test correctly, and safely, building best force they safely can and not pushing through pain.  Patient demonstrated understanding of information      Fernando received cold pack for low back minutes to 10.    Written Home Exercises Provided: yes.  Exercises were reviewed and Fernando was able to demonstrate them prior to the end of the session.  Fernando demonstrated good  understanding of the education provided.     See EMR under Patient Instructions for exercises provided 10/12/2022.      Assessment     Pt presents with reported low back pain that is reproduced lumbar flexion and reduced with lumbar extension indicating directional preference of extension.  Upon physical assessment, pt demonstrates slouched posturing in sitting, mild hip weakness bilaterally, and trunk and hip mobility deficits.  Upon further local examination, hip, lumbar, and thoracic rotation were all limited significantly. Per lumbar MedX testing, pt was 87% below average in strength when compared to those of same age/height/weight/gender. All of the above noted supports potential lumbar classification as a pattern 1 PEP  with recurrent/ or consistent symptoms, thus pt is a good candidate for the Healthy Back Program. Pt would benefit from LE and trunk mobility training, stability training,  improved cardiovascular and muscular endurance, neuromuscular re-education for posture, coordination, and muscular recruitment and education on positional offloading techniques to decrease the intensity and frequency of flare-ups.    Patient is making fair progress towards established goals.  Pt will continue to benefit from skilled outpatient physical therapy to address the deficits stated in the impairment chart, provide pt/family education and to maximize pt's level of independence in the home and community environment.     Anticipated Barriers for therapy: current exacerbation of pain   Pt's spiritual, cultural and educational needs considered and pt agreeable to plan of care and goals as stated below:     GOALS: Pt is in agreement with the following goals.    Short term goals:  6 weeks or 10 visits   1.  Pt will demonstrate increased lumbar ROM by at least 3 degrees from the initial ROM value with improvements noted in functional ROM and ability to perform ADLs.  (approp and ongoing)  2.  Pt will demonstrate increased MedX average isometric strength value  by 15% from initial test resulting in improved ability to perform bending, lifting, and carrying activities safely, confidently.  (approp and ongoing)  3.  Patient report a reduction in worst pain score by 1-2 points for improved tolerance for increased quality of life.  (approp and ongoing)  4.  Pt able to perform HEP correctly with minimal cueing or supervision from therapist to encourage  independent management of symptoms. (approp and ongoing)      Long term goals: 10 weeks or 20 visits   1. Pt will demonstrate increased lumbar ROM by at least 6 degrees from initial ROM value, resulting in improved ability to perform functional fwd bending while standing and sitting. (approp and ongoing)  2. Pt will demonstrate increased MedX average isometric strength value  by 30% from initial test resulting in improved ability to perform bending, lifting, and carrying activities safely, confidently.  (approp and ongoing)  3. Pt to demonstrate ability to independently control and reduce their pain through posture positioning and mechanical movements throughout a typical day.  (approp and ongoing)  4.  Pt will demonstrate reduced pain and improved functional outcomes as reported on the FOTO by reaching a limitation score of < or = 60% or less in order to demonstrate subjective improvement in pt's condition.    (approp and ongoing)  5. Pt will demonstrate independence with the HEP at discharge  (approp and ongoing)    Plan     Reasons for Recertification of Therapy: Patient transfer to [facility] program, see assessment above.    Updated Certification Period: 10/4/2022 to 12/31/2022  Recommended Treatment Plan: 2 times per week for 10 weeks: Cervical/Lumbar Traction, Electrical Stimulation  , Gait Training, Manual Therapy, Moist Heat/ Ice, Neuromuscular Re-ed, Patient Education, Therapeutic Activities, Therapeutic Exercise, and Ultrasound    Raymundo Huerta, PT  10/12/2022      I CERTIFY THE NEED FOR THESE SERVICES FURNISHED UNDER THIS PLAN OF TREATMENT AND WHILE UNDER MY CARE    Physician's comments:        Physician's Signature: ___________________________________________________

## 2022-10-14 NOTE — PLAN OF CARE
[facility] Treatment & Updated Plan of Care     Name: Fernando Skinner  Clinic Number: [id number]    Therapy Diagnosis:   Encounter Diagnoses   Name Primary?    Chronic bilateral low back pain without sciatica Yes    Muscle weakness     Abnormal gait       Physician: Ok Rendon MD    Visit Date: 10/12/2022    Physician Orders: PT Eval and Treat   Medical Diagnosis from Referral: M54.9 (ICD-10-CM) - Dorsalgia, unspecified  Evaluation Date: 10/4/2022  Authorization Period Expiration: 12/31/22  Plan of Care Expiration: 12/31/22  Progress Note Due: 11/4/2022  Visit # / Visits authorized: 1/1 [facility] (2/2 total)   FOTO: 1/3     Time In: 2:30 pm   Time Out: 4:20 pm   Total Billable Time: 100 minutes + 10 minutes cold pack    Insurance: Medicaid     Precautions: Standard , spondylolisthesis grade 1     Pattern of pain determined: 1 PEP    Subjective   Fernando reports his back has hurt from age of 15. He reports he has been rough on his body throughout his life. His pain is in his low back mainly. He reports it limits his activities of daily living severely and is decreasing his quality of life.     Patient reports tolerating previous visit without excess discomfort   Patient reports their pain to be 5/10 on a 0-10 scale with 0 being no pain and 10 being the worst pain imaginable.  Pain Location: bilateral low back    Occupation: electrical work, heavy lifting and difficult positions  Prior Level of Function: pain free with work activities  Current Level of Function: difficulty with movement, ADL's, work activities.     Patients goals: relieve the pain in my back to be able to work, play with kids, and get back to enjoying life.     Objective     MOVEMENT LOSS    ROM Loss   Flexion major loss   Extension moderate loss   Right Side Bend  major loss      Left Side Bend  major loss      Rotation Right major loss   Rotation Left minimal loss     Limitation/Restriction for FOTO 1st  "Survey    Therapist reviewed FOTO scores for Fernando Skinner on 10/12/2022.   FOTO documents entered into [employer] - see Media section.    Limitation Score: 67%        Outcomes: FOTO  Initial score: 67% limitation   Visit 5 score:  Goal: </=60%     Baseline IM Testing Results:   Date of testing: 10/12/2022  ROM 0-36 deg   Max Peak Torque 49    Min Peak Torque 23    Flex/Ext Ratio 2.1/1   % below normative data 87       The following objective data obtain from initial evaluation:  Lumbar Range of Motion    Limitations Pain   Flexion 80%    Significant in low back      Extension 75%    Significant in low back, "tingling in his private area"      Left Side Bending 80% Pain in back      Right Side Bending 80% Pain in back            Myotomes Right Left   L2 4-/5 4-/5   L3 5/5 5/5   L4 4/5 4/5   L5 5/5 5/5   S1 4/5 4/5   S2 4-/5 4-/5       Treatment    Pt was instructed in and performed the following:     Fernando received therapeutic exercises to develop/improved posture, cardiovascular endurance, muscular endurance, lumbar/cervical ROM, strength and muscular endurance for 100 minutes including the following exercises:     BRIAN x 3 minutes   Prone press ups 2 x 10   Prone press up with PT OP 1 x 10   MedX baseline testing     [facility] 10/12/2022   Visit Number 1   VAS Pain Rating 5   Lumbar Extension Seat Pad 0   Femur Restraint 6   Top Dead Center 24   Counterweight 283   Lumbar Flexion 30   Lumbar Extension 0   Lumbar Peak Torque 49   Min Torque 23   Test Percent Below Normative Data 87       Home Exercises Provided and Patient Education Provided   Stretching: prone press up, BRIAN, EIS  Strengthening: bridges  Cardio program (V5):   Lifting education (V11):  Posture/ Using Lumbar Roll: educated, not currently in use    Education provided:   - Patient received education regarding proper posture and body mechanics.  Patient was given top [facility] Visit 1 handouts which discuss what to expect in " therapy, the purpose and opportunity for health coaching, the program,  wellness when discharged from therapy, back education and care specifically for posture seated, standing, lifting correctly, components of exercise, importance of nutrition and hydration, and importance of sleep.   Information on lumbar rolls provided.  - Ryan roll tried, recommended, and purchase information was provided.    - Patient received a handout regarding anticipated muscular soreness following the isometric test and strategies for management were reviewed with patient including stretching, using ice and scheduled rest.   - Patient received education on the [facility] program, purpose of the isometric test, progression of back strengthening as well as wellness approach and systemic strengthening.  Details of the program were discussed.  Reviewed that patient should feel support/pressure from med ex restraints but no pain or discomfort and patient expressed understanding.  Med x dynamic exercise and baseline IM test performed with instructions to guide the patient safely through the isometric testing.  Patient informed to perform isometric test correctly, and safely, building best force they safely can and not pushing through pain.  Patient demonstrated understanding of information      Fernando received cold pack for low back minutes to 10.    Written Home Exercises Provided: yes.  Exercises were reviewed and Fernando was able to demonstrate them prior to the end of the session.  Fernando demonstrated good  understanding of the education provided.     See EMR under Patient Instructions for exercises provided 10/12/2022.      Assessment     Pt presents with reported low back pain that is reproduced lumbar flexion and reduced with lumbar extension indicating directional preference of extension.  Upon physical assessment, pt demonstrates slouched posturing in sitting, mild hip weakness bilaterally, and trunk and hip mobility deficits.  Upon further local examination, hip, lumbar, and thoracic rotation were all limited significantly. Per lumbar MedX testing, pt was 87% below average in strength when compared to those of same age/height/weight/gender. All of the above noted supports potential lumbar classification as a pattern 1 PEP  with recurrent/ or consistent symptoms, thus pt is a good candidate for the [facility] Program. Pt would benefit from LE and trunk mobility training, stability training,  improved cardiovascular and muscular endurance, neuromuscular re-education for posture, coordination, and muscular recruitment and education on positional offloading techniques to decrease the intensity and frequency of flare-ups.    Patient is making fair progress towards established goals.  Pt will continue to benefit from skilled outpatient physical therapy to address the deficits stated in the impairment chart, provide pt/family education and to maximize pt's level of independence in the home and community environment.     Anticipated Barriers for therapy: current exacerbation of pain   Pt's spiritual, cultural and educational needs considered and pt agreeable to plan of care and goals as stated below:     GOALS: Pt is in agreement with the following goals.    Short term goals:  6 weeks or 10 visits   1.  Pt will demonstrate increased lumbar ROM by at least 3 degrees from the initial ROM value with improvements noted in functional ROM and ability to perform ADLs.  (approp and ongoing)  2.  Pt will demonstrate increased MedX average isometric strength value  by 15% from initial test resulting in improved ability to perform bending, lifting, and carrying activities safely, confidently.  (approp and ongoing)  3.  Patient report a reduction in worst pain score by 1-2 points for improved tolerance for increased quality of life.  (approp and ongoing)  4.  Pt able to perform HEP correctly with minimal cueing or supervision from therapist to encourage  independent management of symptoms. (approp and ongoing)      Long term goals: 10 weeks or 20 visits   1. Pt will demonstrate increased lumbar ROM by at least 6 degrees from initial ROM value, resulting in improved ability to perform functional fwd bending while standing and sitting. (approp and ongoing)  2. Pt will demonstrate increased MedX average isometric strength value  by 30% from initial test resulting in improved ability to perform bending, lifting, and carrying activities safely, confidently.  (approp and ongoing)  3. Pt to demonstrate ability to independently control and reduce their pain through posture positioning and mechanical movements throughout a typical day.  (approp and ongoing)  4.  Pt will demonstrate reduced pain and improved functional outcomes as reported on the FOTO by reaching a limitation score of < or = 60% or less in order to demonstrate subjective improvement in pt's condition.    (approp and ongoing)  5. Pt will demonstrate independence with the HEP at discharge  (approp and ongoing)    Plan     Reasons for Recertification of Therapy: Patient transfer to [facility] program, see assessment above.    Updated Certification Period: 10/4/2022 to 12/31/2022  Recommended Treatment Plan: 2 times per week for 10 weeks: Cervical/Lumbar Traction, Electrical Stimulation , Gait Training, Manual Therapy, Moist Heat/ Ice, Neuromuscular Re-ed, Patient Education, Therapeutic Activities, Therapeutic Exercise, and Ultrasound    Raymundo Huerta, PT  10/12/2022      I CERTIFY THE NEED FOR THESE SERVICES FURNISHED UNDER THIS PLAN OF TREATMENT AND WHILE UNDER MY CARE    Physician's comments:        Physician's Signature: ___________________________________________________

## 2022-11-04 ENCOUNTER — OFFICE VISIT (OUTPATIENT)
Dept: PAIN MEDICINE | Facility: CLINIC | Age: 32
End: 2022-11-04
Payer: MEDICAID

## 2022-11-04 VITALS
SYSTOLIC BLOOD PRESSURE: 149 MMHG | WEIGHT: 189.63 LBS | HEART RATE: 82 BPM | BODY MASS INDEX: 25.68 KG/M2 | OXYGEN SATURATION: 100 % | HEIGHT: 72 IN | DIASTOLIC BLOOD PRESSURE: 81 MMHG

## 2022-11-04 DIAGNOSIS — M51.36 DDD (DEGENERATIVE DISC DISEASE), LUMBAR: ICD-10-CM

## 2022-11-04 DIAGNOSIS — M54.9 DORSALGIA, UNSPECIFIED: ICD-10-CM

## 2022-11-04 DIAGNOSIS — M54.16 RIGHT LUMBAR RADICULOPATHY: Primary | ICD-10-CM

## 2022-11-04 DIAGNOSIS — M43.16 SPONDYLOLISTHESIS OF LUMBAR REGION: ICD-10-CM

## 2022-11-04 PROCEDURE — 1159F MED LIST DOCD IN RCRD: CPT | Mod: CPTII,,,

## 2022-11-04 PROCEDURE — 3077F SYST BP >= 140 MM HG: CPT | Mod: CPTII,,,

## 2022-11-04 PROCEDURE — 3008F PR BODY MASS INDEX (BMI) DOCUMENTED: ICD-10-PCS | Mod: CPTII,,,

## 2022-11-04 PROCEDURE — 3079F DIAST BP 80-89 MM HG: CPT | Mod: CPTII,,,

## 2022-11-04 PROCEDURE — 99214 PR OFFICE/OUTPT VISIT, EST, LEVL IV, 30-39 MIN: ICD-10-PCS | Mod: S$PBB,,,

## 2022-11-04 PROCEDURE — 3077F PR MOST RECENT SYSTOLIC BLOOD PRESSURE >= 140 MM HG: ICD-10-PCS | Mod: CPTII,,,

## 2022-11-04 PROCEDURE — 3079F PR MOST RECENT DIASTOLIC BLOOD PRESSURE 80-89 MM HG: ICD-10-PCS | Mod: CPTII,,,

## 2022-11-04 PROCEDURE — 99999 PR PBB SHADOW E&M-EST. PATIENT-LVL III: ICD-10-PCS | Mod: PBBFAC,,,

## 2022-11-04 PROCEDURE — 99214 OFFICE O/P EST MOD 30 MIN: CPT | Mod: S$PBB,,,

## 2022-11-04 PROCEDURE — 3008F BODY MASS INDEX DOCD: CPT | Mod: CPTII,,,

## 2022-11-04 PROCEDURE — 1159F PR MEDICATION LIST DOCUMENTED IN MEDICAL RECORD: ICD-10-PCS | Mod: CPTII,,,

## 2022-11-04 PROCEDURE — 99213 OFFICE O/P EST LOW 20 MIN: CPT | Mod: PBBFAC,PN

## 2022-11-04 PROCEDURE — 99999 PR PBB SHADOW E&M-EST. PATIENT-LVL III: CPT | Mod: PBBFAC,,,

## 2022-11-04 RX ORDER — TIZANIDINE 4 MG/1
4 TABLET ORAL EVERY 6 HOURS PRN
Qty: 40 TABLET | Refills: 2 | Status: SHIPPED | OUTPATIENT
Start: 2022-11-04 | End: 2022-11-14

## 2022-11-04 NOTE — H&P (VIEW-ONLY)
This note was completed with dictation software and grammatical errors may exist.    Chief Complaint   Patient presents with    Follow-up        HPI: Fernando Skinner is a 32 y.o. year old male patient who has no significant past medical history. He presents in referral from No ref. provider found for back pain, right leg pain.  He returns for follow-up.  He was previously scheduled for a epidural however had to cancel due to the loss of his child.  Today he is reporting continued lower back pain, 8/10, constant, worse with any physical activities and only slightly alleviated with rest.  He states the pain starts in his lower back and radiates down his right leg.  He denies any associated numbness nor any adamaris weakness but feels like he does have some pain limited weakness in his right leg.  He denies any changes to his bowel or bladder function.  He was previously started on a trial of gabapentin 300 mg twice daily but he did not tolerate due to it making him feel like he was drunk and unsteady on his feet.    Prior HPI: Fernando Skinner is a 32 y.o. year old male patient who has no significant past medical history. He presents in referral from No ref. provider found for back pain, right leg pain.  Injection.  He reports that he has had back pain for the last 15 years, has had multiple injuries, skateboarding, football.  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  He states that over the last year his pain has severely worsened and has gotten to the point where he has difficulty going to work but he has been making sure that he stable pay the bills.  He states the pain is across the bilateral low back but throughout the entire right leg.  He does have some symptoms in the left side at times but primarily it is in the right side.  He denies any adamaris weakness, no falls.  He states that he gets pain into the right testicle, right anterior thigh, right calf and  anterior shin.  He describes the pain as aching, dull, throbbing, tight, deep, numb sharp and shooting.  Is worse with prolonged sitting or standing, doing any bending, coughing or sneezing or getting out of a bed or a chair.  He does have some left foot weakness from an injury many years ago but he does not feel that there is anything related to his back necessarily.  He does have difficulty picking up his right thigh.  He has been seen by Neurosurgery, Dr. Rendon who has done CT, MRI and x-rays.  He was sent here for possible epidural steroid      Pain intervention history:None    Spine surgeries:  None    Antineuropathics:  NSAIDs:  Ibuprofen 800  Physical therapy:He is currently set up to start PT  Antidepressants:  Muscle relaxers:  Opioids:Had been taking some hydrocodone that was left over from a previous surgery  Antiplatelets/Anticoagulants:        ROS:  Reports weight loss, stomach pain, nausea, joint stiffness, back pain, difficulty sleeping.  Balance of review of systems is negative.    No results found for: LABA1C, HGBA1C    No results found for: WBC, HGB, HCT, MCV, PLT          No past medical history on file.    No past surgical history on file.    Social History     Socioeconomic History    Marital status: Single   Tobacco Use    Smoking status: Some Days     Packs/day: 0.50     Types: Cigarettes   Substance and Sexual Activity    Alcohol use: Yes    Drug use: No    Sexual activity: Yes     Partners: Female         Medications/Allergies: See med card    Vitals:    11/04/22 1503   BP: (!) 149/81   Pulse: 82   SpO2: 100%   Weight: 86 kg (189 lb 9.5 oz)   Height: 6' (1.829 m)   PainSc:   8   PainLoc: Back       Body mass index is 25.71 kg/m².    Physical exam:  Gen: A and O x3, tearful  Skin: No rashes or obvious lesions  HEENT: PERRLA, no obvious deformities on ears or in canals. Trachea midline.  CVS: Regular rate and rhythm, normal palpable pulses.  Resp:No increased work of breathing, symmetrical  chest rise.  Abdomen: Soft, NT/ND.  Musculoskeletal: No antalgic gait. Sits in a forward flexed position.      Neuro:    Iliopsoas Quadriceps Knee  Flexion Tibialis  anterior Gastro- cnemius EHL   Lower: R 4/5 5/5 5/5 5/5 5/5 5/5    L 5/5 5/5 5/5 5/5 5/5 4/5      Left  Right    Patellar DTR 2+ 2+   Achilles DTR 2+ 2+                    Intact and symmetrical to light touch and pinprick in L1-S1 dermatomes bilaterally.    Lumbar spine:  Lumbar spine:  Full with flexion, severely reduced with extension with increased pain in the back and right leg  Demetrio's test causes no increased pain on either side.    Supine straight leg raise is positive for right leg pain at 45°   Internal and external rotation of the hip causes no increased pain on either side.  Myofascial exam: No tenderness to palpation across lumbar paraspinous muscles.    Imagin22 MRI L-spine:   There is no evidence of lumbar fracture or osseous destructive lesion. Degenerative endplate signal changes are noted at L3-4. There is bilateral L3 spondylolysis with grade 1 spondylolisthesis of up to 4 mm. Alignment at all of the levels is normal. Signal within the conus medullaris is within normal limits.   T12-L1: Mild broad-based disc bulge without significant central canal or foraminal stenosis.   L1-2: No significant abnormalities.   L2-3: There is disc desiccation with mild broad-based disc protrusion and outer annular tear. There is resultant mild narrowing of the spinal canal. The foramina are unremarkable.   L3-4: Grade 1 spondylolisthesis and mild disc bulging result in mild narrowing of the spinal canal. There is mild to moderate bilateral foraminal narrowing. Disc material is in close proximity to the L3 nerve roots, without definite nerve root impingement.   L4-5: Mild broad-based disc bulge results in mild narrowing of the central canal and foramina, without nerve root impingement.   L5-S1: Mild right-sided degenerative facet hypertrophy.  No significant central canal or foraminal stenosis.    9/14/22 CT L-spine:  Vertebral column: As seen on comparison studies, there is grade 1 spondylolisthesis at the L3-4 level measuring approximately 4-5 mm on supine CT with a similar appearance to comparison MRI.  There is bilateral spondylolysis at this level as well as at the L2-3 level.  There is moderate to marked disc space narrowing at the L3-4 level.  There is also mild retrolisthesis of L4 on L5 measuring approximately 3 mm, unchanged.  Alignment is otherwise normal.  Vertebral body height is preserved.  There is no fracture.   Spinal canal, conus, epidural space: The spinal canal may be borderline small on a developmental basis.  There is no abnormal epidural mass or fluid collection.   Findings by level:   T12-L1: There is a mild disc bulge.  There is no spinal canal or significant foraminal stenosis.  There is no change.   L1-2: There is no spinal canal or significant foraminal stenosis.   L2-3: There is a mild disc bulge which flattens the ventral dural sac.  There is no spinal stenosis.  There is mild bilateral foraminal stenosis.  There is bilateral spondylolysis.   L3-4: There is grade 1 spondylolisthesis with bilateral spondylolysis, disc space narrowing with unroofing of a moderate diffuse disc bulge.  There is no spinal stenosis but there is moderate bilateral foraminal stenosis.   L4-5: There is mild retrolisthesis of L4 on L5.  There is mild disc space narrowing at this level.  There is inferior unroofing of a broad disc protrusion.  There is no spinal stenosis.  There is moderate left and mild right foraminal stenosis without change.   L5-S1: There is no spinal canal or foraminal stenosis.  There is mild facet joint arthropathy and partial sacralization of L5 on the left.   Soft tissues, other: The prevertebral soft tissues are normal.  The aorta is normal in caliber.  There is no hydronephrosis.    9/14/22 L-spine flex/ext:  1. There is  anterolisthesis of L3 on L4 where there is bilateral spondylolysis.  There is motion at this level with flexion and extension.  2. There is mild retrolisthesis of L4 on L5 which is unchanged on flexion or extension.  3. There is no acute fracture.    Assessment:  Fernando Skinner is a 31 y.o. year old male patient who has no past medical history on file. He presents in referral from Dr. Ok Rendon for back pain.  1. Dorsalgia, unspecified  tiZANidine (ZANAFLEX) 4 MG tablet      2. Right lumbar radiculopathy        3. Spondylolisthesis of lumbar region        4. DDD (degenerative disc disease), lumbar              Plan:  1.  Today we discussed his pain and symptoms.  He continues to have severe lower back pain with radiation right leg.  2. I would like to reschedule him for previous L4/5 HERLINDA with spread to the right.  3. Follow-up 4 weeks post procedure.  Based on relief of HERLINDA can consider trial of Lyrica, Cymbalta or amitriptyline.  He is going through significant grief over the loss of his child so these may be good choices for both his anxiety, depression and for his pain.  Trial tizanidine provided today for the myofascial component of his pain.          Thank you for referring this interesting patient, and I look forward to continuing to collaborate in his care.

## 2022-11-16 RX ORDER — TIZANIDINE 4 MG/1
4 TABLET ORAL EVERY 6 HOURS PRN
COMMUNITY

## 2022-11-18 ENCOUNTER — HOSPITAL ENCOUNTER (OUTPATIENT)
Facility: HOSPITAL | Age: 32
Discharge: HOME | End: 2022-11-18
Attending: ANESTHESIOLOGY | Admitting: ANESTHESIOLOGY
Payer: MEDICAID

## 2022-11-18 ENCOUNTER — TELEPHONE (OUTPATIENT)
Dept: PAIN MEDICINE | Facility: CLINIC | Age: 32
End: 2022-11-18

## 2022-11-18 ENCOUNTER — HOSPITAL ENCOUNTER (OUTPATIENT)
Dept: RADIOLOGY | Facility: HOSPITAL | Age: 32
Discharge: HOME | End: 2022-11-18
Attending: ANESTHESIOLOGY
Payer: MEDICAID

## 2022-11-18 VITALS
HEIGHT: 72 IN | TEMPERATURE: 98 F | DIASTOLIC BLOOD PRESSURE: 75 MMHG | HEART RATE: 66 BPM | BODY MASS INDEX: 27.09 KG/M2 | WEIGHT: 200 LBS | RESPIRATION RATE: 16 BRPM | SYSTOLIC BLOOD PRESSURE: 122 MMHG | OXYGEN SATURATION: 98 %

## 2022-11-18 DIAGNOSIS — M54.50 LOWER BACK PAIN: ICD-10-CM

## 2022-11-18 DIAGNOSIS — M54.16 RIGHT LUMBAR RADICULOPATHY: ICD-10-CM

## 2022-11-18 PROCEDURE — 63600175 PHARM REV CODE 636 W HCPCS: Mod: PO | Performed by: ANESTHESIOLOGY

## 2022-11-18 PROCEDURE — 25500020 PHARM REV CODE 255: Mod: PO | Performed by: ANESTHESIOLOGY

## 2022-11-18 PROCEDURE — 62323 NJX INTERLAMINAR LMBR/SAC: CPT | Mod: PO | Performed by: ANESTHESIOLOGY

## 2022-11-18 PROCEDURE — 76000 FLUOROSCOPY <1 HR PHYS/QHP: CPT | Mod: TC,PO

## 2022-11-18 PROCEDURE — 62323 PR INJ LUMBAR/SACRAL, W/IMAGING GUIDANCE: ICD-10-PCS | Mod: ,,, | Performed by: ANESTHESIOLOGY

## 2022-11-18 PROCEDURE — 62323 NJX INTERLAMINAR LMBR/SAC: CPT | Mod: ,,, | Performed by: ANESTHESIOLOGY

## 2022-11-18 PROCEDURE — 25000003 PHARM REV CODE 250: Mod: PO | Performed by: ANESTHESIOLOGY

## 2022-11-18 RX ORDER — LIDOCAINE HYDROCHLORIDE 10 MG/ML
INJECTION, SOLUTION EPIDURAL; INFILTRATION; INTRACAUDAL; PERINEURAL
Status: DISCONTINUED | OUTPATIENT
Start: 2022-11-18 | End: 2022-11-18 | Stop reason: HOSPADM

## 2022-11-18 RX ORDER — ALPRAZOLAM 0.5 MG/1
1 TABLET, ORALLY DISINTEGRATING ORAL ONCE AS NEEDED
Status: COMPLETED | OUTPATIENT
Start: 2022-11-18 | End: 2022-11-18

## 2022-11-18 RX ORDER — METHYLPREDNISOLONE ACETATE 80 MG/ML
INJECTION, SUSPENSION INTRA-ARTICULAR; INTRALESIONAL; INTRAMUSCULAR; SOFT TISSUE
Status: DISCONTINUED | OUTPATIENT
Start: 2022-11-18 | End: 2022-11-18 | Stop reason: HOSPADM

## 2022-11-18 RX ADMIN — ALPRAZOLAM 1 MG: 0.5 TABLET, ORALLY DISINTEGRATING ORAL at 10:11

## 2022-11-18 NOTE — OP NOTE
PROCEDURE DATE: 11/18/2022    Lumbar Interlaminar Epidural Steroid Injection under Fluoroscopic Guidance, AP Approach.    Procedure:   Interlaminar epidural steroid injection at L4/5 under fluoroscopic guidance.    Diagnosis: lUMBAR Radiculopathy    pOSTOP DIAGNOSIS: sAME    Physician: Carlos William M.D.    Medications injected:80 mg methylprednisone with 4 ml of preservative free NaCl    Local anesthetic injected:    Lidocaine 1% 4 ml total    Sedation Medications: none    Estimated blood loss:  none    Complications:  none    Technique:  Time-out taken to identify patient and procedure prior to starting the procedure.  With the patient laying in a prone position, the area was prepped and draped in the usual sterile fashion using ChloraPrep and a fenestrated drape.  After determining the target level with an AP fluoroscopic view, local anesthetic was given using a 25-gauge 1.5 inch needle by raising a wheal and then infiltrating toward the interlaminar entry space.  A 3.5 inch 20-gauge Touhy needle was introduced under AP fluoroscopic guidance to the interlaminar space of L4/5. Once the trajectory was established, the needle was visualized in the lateral view and advanced using loss of resistance technique. Once in the desired position, omnipaque contrast was injected to confirm placement and there was no vascular uptake nor intrathecal spread.  The medication was then injected slowly. The patient tolerated the procedure well.      The patient was monitored after the procedure.   They were given post-procedure and discharge instructions to follow at home.  The patient was discharged in a stable condition.

## 2022-11-18 NOTE — TELEPHONE ENCOUNTER
Return call placed to Pt with questions concerning pre-op medications. Pt advised that he was given Xanax (Alprazolam) in a dissolvable tablet. Pt verbalized understanding.

## 2022-11-18 NOTE — INTERVAL H&P NOTE
The patient has been examined and the H&P has been reviewed:    I concur with the findings and no changes have occurred since H&P was written.    Procedure risks, benefits and alternative options discussed and understood by patient/family.    ASA 2, mallampati 2        There are no hospital problems to display for this patient.    
1+

## 2022-11-18 NOTE — TELEPHONE ENCOUNTER
----- Message from Betty Hutson sent at 11/18/2022 11:52 AM CST -----  Contact: Patient  Type:  Needs Medical Advice    Who Called: Patient    Symptoms (please be specific): Post surgery     Would the patient rather a call back or a response via [facility]? Call    Best Call Back Number: [phone] (home)     Additional Information:  Patient has questions about what was given to him pre-op. Patient states medication was to be dissolved in his mouth. Please call to advise.

## 2022-11-18 NOTE — DISCHARGE SUMMARY
[facility] - Surgery  Discharge Note  Short Stay    Procedure(s) (LRB):  Injection-steroid-epidural-lumbar L4/5 to right (N/A)      OUTCOME: Patient tolerated treatment/procedure well without complication and is now ready for discharge.    DISPOSITION: Home or Self Care    FINAL DIAGNOSIS:  Right lumbar radiculopathy    FOLLOWUP: In clinic    DISCHARGE INSTRUCTIONS:    Discharge Procedure Orders   Diet Adult Regular     No dressing needed     Notify your health care provider if you experience any of the following:  temperature >100.4     Activity as tolerated

## 2022-12-16 ENCOUNTER — OFFICE VISIT (OUTPATIENT)
Dept: PAIN MEDICINE | Facility: CLINIC | Age: 32
End: 2022-12-16
Payer: MEDICAID

## 2022-12-16 VITALS
WEIGHT: 199.06 LBS | BODY MASS INDEX: 26.96 KG/M2 | HEART RATE: 66 BPM | OXYGEN SATURATION: 98 % | DIASTOLIC BLOOD PRESSURE: 78 MMHG | HEIGHT: 72 IN | SYSTOLIC BLOOD PRESSURE: 145 MMHG

## 2022-12-16 DIAGNOSIS — M54.16 RIGHT LUMBAR RADICULOPATHY: ICD-10-CM

## 2022-12-16 DIAGNOSIS — M54.9 DORSALGIA, UNSPECIFIED: ICD-10-CM

## 2022-12-16 DIAGNOSIS — M47.816 LUMBAR SPONDYLOSIS: ICD-10-CM

## 2022-12-16 DIAGNOSIS — M54.16 LUMBAR RADICULOPATHY: Primary | ICD-10-CM

## 2022-12-16 DIAGNOSIS — M43.16 SPONDYLOLISTHESIS OF LUMBAR REGION: ICD-10-CM

## 2022-12-16 PROCEDURE — 99999 PR PBB SHADOW E&M-EST. PATIENT-LVL III: ICD-10-PCS | Mod: PBBFAC,,,

## 2022-12-16 PROCEDURE — 1159F MED LIST DOCD IN RCRD: CPT | Mod: CPTII,,,

## 2022-12-16 PROCEDURE — 99999 PR PBB SHADOW E&M-EST. PATIENT-LVL III: CPT | Mod: PBBFAC,,,

## 2022-12-16 PROCEDURE — 99214 PR OFFICE/OUTPT VISIT, EST, LEVL IV, 30-39 MIN: ICD-10-PCS | Mod: S$PBB,,,

## 2022-12-16 PROCEDURE — 1159F PR MEDICATION LIST DOCUMENTED IN MEDICAL RECORD: ICD-10-PCS | Mod: CPTII,,,

## 2022-12-16 PROCEDURE — 3078F PR MOST RECENT DIASTOLIC BLOOD PRESSURE < 80 MM HG: ICD-10-PCS | Mod: CPTII,,,

## 2022-12-16 PROCEDURE — 3078F DIAST BP <80 MM HG: CPT | Mod: CPTII,,,

## 2022-12-16 PROCEDURE — 3008F BODY MASS INDEX DOCD: CPT | Mod: CPTII,,,

## 2022-12-16 PROCEDURE — 3008F PR BODY MASS INDEX (BMI) DOCUMENTED: ICD-10-PCS | Mod: CPTII,,,

## 2022-12-16 PROCEDURE — 99214 OFFICE O/P EST MOD 30 MIN: CPT | Mod: S$PBB,,,

## 2022-12-16 PROCEDURE — 99213 OFFICE O/P EST LOW 20 MIN: CPT | Mod: PBBFAC,PN

## 2022-12-16 PROCEDURE — 3077F PR MOST RECENT SYSTOLIC BLOOD PRESSURE >= 140 MM HG: ICD-10-PCS | Mod: CPTII,,,

## 2022-12-16 PROCEDURE — 3077F SYST BP >= 140 MM HG: CPT | Mod: CPTII,,,

## 2022-12-16 RX ORDER — AMITRIPTYLINE HYDROCHLORIDE 10 MG/1
10 TABLET, FILM COATED ORAL NIGHTLY PRN
Qty: 30 TABLET | Refills: 1 | Status: SHIPPED | OUTPATIENT
Start: 2022-12-16 | End: 2023-02-13

## 2022-12-16 NOTE — PROGRESS NOTES
This note was completed with dictation software and grammatical errors may exist.    Chief Complaint   Patient presents with    Back Pain        HPI: Fernando Skinner is a 32 y.o. year old male patient who has no significant past medical history. He presents in referral from No ref. provider found for back pain, right leg pain.  He is status post L4/5 HERLINDA with spread to the right on 11/18/2022 with 50% relief of his previous right-sided radicular pain.  Overall he states his remaining radicular pain is somewhat tolerable.  Today's reporting continued lower back pain 7/10, constant, significantly worsened with physical activities and somewhat alleviated with rest.  This pain is mainly located across his lower back around his belt line.  He continues have some pain radiating down left and right leg however at this time this is tolerable.  Denies any new numbness, weakness or any new changes with his bowel bladder function.      Prior HPI: Fernando Skinner is a 32 y.o. year old male patient who has no significant past medical history. He presents in referral from No ref. provider found for back pain, right leg pain.  Injection.  He reports that he has had back pain for the last 15 years, has had multiple injuries, skateboarding, football.  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  He states that over the last year his pain has severely worsened and has gotten to the point where he has difficulty going to work but he has been making sure that he stable pay the bills.  He states the pain is across the bilateral low back but throughout the entire right leg.  He does have some symptoms in the left side at times but primarily it is in the right side.  He denies any adamaris weakness, no falls.  He states that he gets pain into the right testicle, right anterior thigh, right calf and anterior shin.  He describes the pain as aching, dull, throbbing, tight, deep, numb  sharp and shooting.  Is worse with prolonged sitting or standing, doing any bending, coughing or sneezing or getting out of a bed or a chair.  He does have some left foot weakness from an injury many years ago but he does not feel that there is anything related to his back necessarily.  He does have difficulty picking up his right thigh.  He has been seen by Neurosurgery, Dr. Rendon who has done CT, MRI and x-rays.  He was sent here for possible epidural steroid      Pain intervention history:  He is status post L4/5 HERLINDA with spread to the right on 11/18/2022 with 50% relief of his previous right-sided radicular pain.    Spine surgeries:  None    Antineuropathics:  NSAIDs:  Ibuprofen 800  Physical therapy:He is currently set up to start PT  Antidepressants:  Muscle relaxers:  Opioids:Had been taking some hydrocodone that was left over from a previous surgery  Antiplatelets/Anticoagulants:        ROS:  Reports weight loss, stomach pain, nausea, joint stiffness, back pain, difficulty sleeping.  Balance of review of systems is negative.    No results found for: LABA1C, HGBA1C    No results found for: WBC, HGB, HCT, MCV, PLT          No past medical history on file.    Past Surgical History:   Procedure Laterality Date    EPIDURAL STEROID INJECTION INTO LUMBAR SPINE N/A 11/18/2022    Procedure: Injection-steroid-epidural-lumbar L4/5 to right;  Surgeon: Carlos William MD;  Location: [facility];  Service: Pain Management;  Laterality: N/A;    ESOPHAGOGASTRODUODENOSCOPY  2022       Social History     Socioeconomic History    Marital status: Single   Tobacco Use    Smoking status: Some Days     Packs/day: 0.50     Types: Cigarettes   Substance and Sexual Activity    Alcohol use: Yes     Alcohol/week: 3.0 standard drinks     Types: 3 Cans of beer per week    Drug use: No    Sexual activity: Yes     Partners: Female         Medications/Allergies: See med card    Vitals:    12/16/22 1015   BP: (!) 145/78   Pulse: 66   SpO2:  98%   Weight: 90.3 kg (199 lb 1.2 oz)   Height: 6' (1.829 m)   PainSc:   7   PainLoc: Back       Body mass index is 27 kg/m².    Physical exam:  Gen: A and O x3, tearful  Skin: No rashes or obvious lesions  HEENT: PERRLA, no obvious deformities on ears or in canals. Trachea midline.  CVS: Regular rate and rhythm, normal palpable pulses.  Resp:No increased work of breathing, symmetrical chest rise.  Abdomen: Soft, NT/ND.  Musculoskeletal: No antalgic gait. Sits in a forward flexed position.      Neuro:    Iliopsoas Quadriceps Knee  Flexion Tibialis  anterior Gastro- cnemius EHL   Lower: R 5/5 5/5 5/5 5/5 5/5 5/5    L 5/5 5/5 5/5 5/5 5/5 5/5      Left  Right    Patellar DTR 2+ 2+   Achilles DTR 2+ 2+                    Intact and symmetrical to light touch and pinprick in L1-S1 dermatomes bilaterally.    Lumbar spine:  Lumbar spine:  Full with flexion and extension  Demetrio's test causes no increased pain on either side.    Supine straight leg raise is positive for right leg pain at 45°   Internal and external rotation of the hip causes no increased pain on either side.  Myofascial exam: No tenderness to palpation across lumbar paraspinous muscles.  Provocative:  Increased pain with bilateral axial facet loading    Imagin22 MRI L-spine:   There is no evidence of lumbar fracture or osseous destructive lesion. Degenerative endplate signal changes are noted at L3-4. There is bilateral L3 spondylolysis with grade 1 spondylolisthesis of up to 4 mm. Alignment at all of the levels is normal. Signal within the conus medullaris is within normal limits.   T12-L1: Mild broad-based disc bulge without significant central canal or foraminal stenosis.   L1-2: No significant abnormalities.   L2-3: There is disc desiccation with mild broad-based disc protrusion and outer annular tear. There is resultant mild narrowing of the spinal canal. The foramina are unremarkable.   L3-4: Grade 1 spondylolisthesis and mild disc bulging  result in mild narrowing of the spinal canal. There is mild to moderate bilateral foraminal narrowing. Disc material is in close proximity to the L3 nerve roots, without definite nerve root impingement.   L4-5: Mild broad-based disc bulge results in mild narrowing of the central canal and foramina, without nerve root impingement.   L5-S1: Mild right-sided degenerative facet hypertrophy. No significant central canal or foraminal stenosis.    9/14/22 CT L-spine:  Vertebral column: As seen on comparison studies, there is grade 1 spondylolisthesis at the L3-4 level measuring approximately 4-5 mm on supine CT with a similar appearance to comparison MRI.  There is bilateral spondylolysis at this level as well as at the L2-3 level.  There is moderate to marked disc space narrowing at the L3-4 level.  There is also mild retrolisthesis of L4 on L5 measuring approximately 3 mm, unchanged.  Alignment is otherwise normal.  Vertebral body height is preserved.  There is no fracture.   Spinal canal, conus, epidural space: The spinal canal may be borderline small on a developmental basis.  There is no abnormal epidural mass or fluid collection.   Findings by level:   T12-L1: There is a mild disc bulge.  There is no spinal canal or significant foraminal stenosis.  There is no change.   L1-2: There is no spinal canal or significant foraminal stenosis.   L2-3: There is a mild disc bulge which flattens the ventral dural sac.  There is no spinal stenosis.  There is mild bilateral foraminal stenosis.  There is bilateral spondylolysis.   L3-4: There is grade 1 spondylolisthesis with bilateral spondylolysis, disc space narrowing with unroofing of a moderate diffuse disc bulge.  There is no spinal stenosis but there is moderate bilateral foraminal stenosis.   L4-5: There is mild retrolisthesis of L4 on L5.  There is mild disc space narrowing at this level.  There is inferior unroofing of a broad disc protrusion.  There is no spinal  stenosis.  There is moderate left and mild right foraminal stenosis without change.   L5-S1: There is no spinal canal or foraminal stenosis.  There is mild facet joint arthropathy and partial sacralization of L5 on the left.   Soft tissues, other: The prevertebral soft tissues are normal.  The aorta is normal in caliber.  There is no hydronephrosis.    9/14/22 L-spine flex/ext:  1. There is anterolisthesis of L3 on L4 where there is bilateral spondylolysis.  There is motion at this level with flexion and extension.  2. There is mild retrolisthesis of L4 on L5 which is unchanged on flexion or extension.  3. There is no acute fracture.    Assessment:  Fernando Skinner is a 31 y.o. year old male patient who has no past medical history on file. He presents in referral from Dr. Ok Rendon for back pain.  1. Lumbar radiculopathy        2. Right lumbar radiculopathy  amitriptyline (ELAVIL) 10 MG tablet      3. Dorsalgia, unspecified        4. Spondylolisthesis of lumbar region        5. Lumbar spondylosis                Plan:  I think he found some relief with the most recent epidural however not complete.  He does state that is remaining radicular pain is tolerable.  We reviewed his lumbar MRI in clinic today and is consistent with  L3-4: Grade 1 spondylolisthesis and mild disc bulging result in mild narrowing of the spinal canal. There is mild to moderate bilateral foraminal narrowing. Disc material is in close proximity to the L3 nerve roots, without definite nerve root impingement. L4-5: Mild broad-based disc bulge results in mild narrowing of the central canal and foramina.  He also has some facet arthropathy worse at L4/5 and L5/S1  At this time he would like to maximize conservative therapy with rest, medications and at-home PT directed exercises.  Pamphlet for PT directed exercises provided today.  For his remaining neuropathic pain I would like to start him on a trial of amitriptyline 10 mg nightly.  He has  tried gabapentin in the past and did not tolerate.  Additionally, he is still grieving loss of his child and I think this may be beneficial for his anxiety and depression.  He has been taking lorazepam p.r.n. I feel like this would be a safer alternative for his anxiety and depression.  Follow up in 6 weeks to see how he is tolerating the medication and at-home PT directed exercises.  At that time can consider either repeat epidural or potential diagnostic medial branch blocks.            Thank you for referring this interesting patient, and I look forward to continuing to collaborate in his care.

## 2023-02-15 ENCOUNTER — DOCUMENTATION ONLY (OUTPATIENT)
Dept: REHABILITATION | Facility: HOSPITAL | Age: 33
End: 2023-02-15
Payer: MEDICAID

## 2023-02-15 PROBLEM — M62.81 MUSCLE WEAKNESS: Status: RESOLVED | Noted: 2022-10-05 | Resolved: 2023-02-15

## 2023-02-15 PROBLEM — R26.9 ABNORMAL GAIT: Status: RESOLVED | Noted: 2022-10-05 | Resolved: 2023-02-15

## 2023-02-15 PROBLEM — M54.50 CHRONIC BILATERAL LOW BACK PAIN WITHOUT SCIATICA: Status: RESOLVED | Noted: 2022-10-05 | Resolved: 2023-02-15

## 2023-02-15 PROBLEM — G89.29 CHRONIC BILATERAL LOW BACK PAIN WITHOUT SCIATICA: Status: RESOLVED | Noted: 2022-10-05 | Resolved: 2023-02-15

## 2023-02-15 NOTE — PROGRESS NOTES
Patient was discharged from the [facility] program 2' non-attendance.  Patient was seen x 1 visits.

## 2025-01-01 ENCOUNTER — HOSPITAL ENCOUNTER (EMERGENCY)
Facility: HOSPITAL | Age: 35
Discharge: LEFT BEFORE DISCHARGE | End: 2025-01-01
Payer: COMMERCIAL

## 2025-01-01 VITALS
SYSTOLIC BLOOD PRESSURE: 134 MMHG | RESPIRATION RATE: 18 BRPM | WEIGHT: 240 LBS | DIASTOLIC BLOOD PRESSURE: 85 MMHG | HEART RATE: 98 BPM | BODY MASS INDEX: 32.51 KG/M2 | OXYGEN SATURATION: 98 % | HEIGHT: 72 IN | TEMPERATURE: 98 F

## 2025-01-01 PROCEDURE — 99900041 HC LEFT WITHOUT BEING SEEN- EMERGENCY

## 2025-01-01 NOTE — ED NOTES
Pt states he tripped and fell striking his face on edge of porch. Wife states pt was unconscious for less than a minute. Pt c/o of pain on left lower jaw with missing/broken bottom teeth.

## 2025-01-01 NOTE — ED NOTES
Pt states he did not want to stay and is having wife drive him to [facility]. IV removed and physician notified

## (undated) DEVICE — TRAY NERVE BLOCK

## (undated) DEVICE — GLOVE SURGICAL LATEX SZ 7

## (undated) DEVICE — NDL TUOHY EPIDURAL 20G X 3.5

## (undated) DEVICE — APPLICATOR CHLORAPREP CLR 10.5

## (undated) DEVICE — MARKER SKIN STND TIP BLUE BARR

## (undated) DEVICE — SYR GLASS 5CC LUER LOK